# Patient Record
Sex: FEMALE | Race: WHITE | NOT HISPANIC OR LATINO | URBAN - METROPOLITAN AREA
[De-identification: names, ages, dates, MRNs, and addresses within clinical notes are randomized per-mention and may not be internally consistent; named-entity substitution may affect disease eponyms.]

---

## 2019-10-14 PROBLEM — 13644009 HYPERCHOLESTEROLEMIA: Status: ACTIVE | Noted: 2019-10-14

## 2019-10-14 PROBLEM — 13645005 CHRONIC OBSTRUCTIVE PULMONARY DISEASE: Status: ACTIVE | Noted: 2019-10-14

## 2019-10-14 PROBLEM — 70153002 HEMORRHOIDS: Status: ACTIVE | Noted: 2019-10-14

## 2019-10-14 PROBLEM — 35489007 DEPRESSION: Status: ACTIVE | Noted: 2019-10-14

## 2019-10-14 PROBLEM — 38341003 HYPERTENSION: Status: ACTIVE | Noted: 2019-10-14

## 2019-11-18 PROBLEM — 134407002 CHRONIC BACK PAIN: Status: ACTIVE | Noted: 2019-11-18

## 2020-06-10 ENCOUNTER — OFFICE VISIT (OUTPATIENT)
Dept: URBAN - METROPOLITAN AREA CLINIC 46 | Facility: CLINIC | Age: 60
End: 2020-06-10

## 2020-06-22 ENCOUNTER — OFFICE VISIT (OUTPATIENT)
Dept: URBAN - METROPOLITAN AREA CLINIC 46 | Facility: CLINIC | Age: 60
End: 2020-06-22

## 2020-06-23 ENCOUNTER — OFFICE VISIT (OUTPATIENT)
Dept: URBAN - METROPOLITAN AREA CLINIC 13 | Facility: CLINIC | Age: 60
End: 2020-06-23

## 2021-08-28 ENCOUNTER — TELEPHONE ENCOUNTER (OUTPATIENT)
Dept: URBAN - METROPOLITAN AREA CLINIC 13 | Facility: CLINIC | Age: 61
End: 2021-08-28

## 2021-08-28 RX ORDER — IBUPROFEN 800 MG/1
TABLET ORAL
OUTPATIENT
End: 2020-05-20

## 2021-08-28 RX ORDER — METRONIDAZOLE 500 MG/1
TABLET ORAL
OUTPATIENT
Start: 2020-05-22 | End: 2020-05-26

## 2021-08-28 RX ORDER — LISINOPRIL 20 MG/1
TABLET ORAL
OUTPATIENT
End: 2020-05-20

## 2021-08-28 RX ORDER — FLUCONAZOLE 200 MG/1
TABLET ORAL
OUTPATIENT
Start: 2020-05-27 | End: 2020-06-22

## 2021-08-28 RX ORDER — TIZANIDINE 4 MG/1
TABLET ORAL
OUTPATIENT
End: 2020-05-20

## 2021-08-28 RX ORDER — LIDOCAINE 4% CREAM 4.27 G/100G
CREAM TOPICAL
OUTPATIENT
Start: 2019-11-18 | End: 2020-05-20

## 2021-08-28 RX ORDER — EZETIMIBE 10 MG/1
TABLET ORAL
OUTPATIENT
End: 2020-05-20

## 2021-08-28 RX ORDER — CIPROFLOXACIN HYDROCHLORIDE 500 MG/1
TABLET, FILM COATED ORAL
OUTPATIENT
Start: 2020-05-22 | End: 2020-05-26

## 2021-08-28 RX ORDER — PREDNISONE 10 MG/1
TABLET ORAL
OUTPATIENT
Start: 2020-06-16 | End: 2020-06-22

## 2021-08-28 RX ORDER — MELOXICAM 15 MG/1
TABLET ORAL
OUTPATIENT
End: 2020-05-20

## 2021-08-28 RX ORDER — ALBUTEROL SULFATE 90 UG/1
AEROSOL, METERED RESPIRATORY (INHALATION)
OUTPATIENT
End: 2020-05-20

## 2021-08-28 RX ORDER — PREGABALIN 100 MG/1
CAPSULE ORAL
OUTPATIENT
End: 2020-05-22

## 2021-08-29 ENCOUNTER — TELEPHONE ENCOUNTER (OUTPATIENT)
Dept: URBAN - METROPOLITAN AREA CLINIC 13 | Facility: CLINIC | Age: 61
End: 2021-08-29

## 2021-08-29 RX ORDER — PREDNISONE 10 MG/1
TABLET ORAL
Status: ACTIVE | COMMUNITY
Start: 2020-06-09

## 2021-08-29 RX ORDER — LISINOPRIL 20 MG/1
TABLET ORAL
Status: ACTIVE | COMMUNITY

## 2021-08-29 RX ORDER — DICYCLOMINE HYDROCHLORIDE 20 MG/1
TABLET ORAL
Status: ACTIVE | COMMUNITY

## 2021-08-29 RX ORDER — PREGABALIN 100 MG/1
CAPSULE ORAL
Status: ACTIVE | COMMUNITY

## 2021-08-29 RX ORDER — CHOLESTYRAMINE 4 G/5.5G
POWDER, FOR SUSPENSION ORAL
Status: ACTIVE | COMMUNITY
Start: 2020-05-22

## 2021-08-29 RX ORDER — LORATADINE 10 MG/1
CAPSULE, LIQUID FILLED ORAL
Status: ACTIVE | COMMUNITY

## 2021-08-29 RX ORDER — DICLOFENAC SODIUM 75 MG/1
TABLET, DELAYED RELEASE ORAL
Status: ACTIVE | COMMUNITY

## 2021-08-29 RX ORDER — TIZANIDINE 4 MG/1
TABLET ORAL
Status: ACTIVE | COMMUNITY

## 2021-08-29 RX ORDER — DIPHENOXYLATE HYDROCHLORIDE AND ATROPINE SULFATE 2.5; .025 MG/1; MG/1
TABLET ORAL
Status: ACTIVE | COMMUNITY
Start: 2020-05-20

## 2021-09-21 ENCOUNTER — ERX REFILL RESPONSE (OUTPATIENT)
Dept: URBAN - METROPOLITAN AREA CLINIC 44 | Facility: CLINIC | Age: 61
End: 2021-09-21

## 2021-09-21 ENCOUNTER — TELEPHONE ENCOUNTER (OUTPATIENT)
Dept: URBAN - METROPOLITAN AREA CLINIC 46 | Facility: CLINIC | Age: 61
End: 2021-09-21

## 2021-09-21 RX ORDER — MESALAMINE 375 MG/1
TAKE FOUR CAPSULES BY MOUTH DAILY CAPSULE, EXTENDED RELEASE ORAL
Qty: 360 CAPSULE | Refills: 1 | OUTPATIENT

## 2021-10-27 ENCOUNTER — ERX REFILL RESPONSE (OUTPATIENT)
Dept: URBAN - METROPOLITAN AREA CLINIC 44 | Facility: CLINIC | Age: 61
End: 2021-10-27

## 2021-10-27 RX ORDER — DICYCLOMINE HYDROCHLORIDE 20 MG/1
1 TABLET TABLET ORAL
Qty: 120 | Refills: 4 | OUTPATIENT

## 2021-10-27 RX ORDER — DICYCLOMINE HYDROCHLORIDE 20 MG/1
TABLET ORAL
OUTPATIENT

## 2022-06-20 ENCOUNTER — ERX REFILL RESPONSE (OUTPATIENT)
Dept: URBAN - METROPOLITAN AREA CLINIC 44 | Facility: CLINIC | Age: 62
End: 2022-06-20

## 2022-06-20 RX ORDER — DICYCLOMINE HYDROCHLORIDE 20 MG/1
TAKE ONE TABLET BY MOUTH FOUR TIMES A DAY AS NEEDED FOR SPASMS TABLET ORAL
Qty: 120 TABLET | Refills: 3 | OUTPATIENT

## 2022-06-20 RX ORDER — DICYCLOMINE HYDROCHLORIDE 20 MG/1
1 TABLET TABLET ORAL
Qty: 120 | Refills: 4 | OUTPATIENT

## 2022-09-30 ENCOUNTER — TELEPHONE ENCOUNTER (OUTPATIENT)
Dept: URBAN - METROPOLITAN AREA CLINIC 48 | Facility: CLINIC | Age: 62
End: 2022-09-30

## 2022-09-30 ENCOUNTER — ERX REFILL RESPONSE (OUTPATIENT)
Dept: URBAN - METROPOLITAN AREA CLINIC 46 | Facility: CLINIC | Age: 62
End: 2022-09-30

## 2022-09-30 RX ORDER — MESALAMINE 0.38 G/1
TAKE FOUR CAPSULES BY MOUTH DAILY CAPSULE, EXTENDED RELEASE ORAL
Qty: 360 CAPSULE | Refills: 3 | OUTPATIENT

## 2022-09-30 RX ORDER — MESALAMINE 0.38 G/1
TAKE FOUR CAPSULES BY MOUTH DAILY CAPSULE, EXTENDED RELEASE ORAL ONCE A DAY
Qty: 120 | Refills: 0

## 2022-09-30 RX ORDER — MESALAMINE 375 MG/1
TAKE FOUR CAPSULES BY MOUTH DAILY CAPSULE, EXTENDED RELEASE ORAL
Qty: 360 CAPSULE | Refills: 1 | OUTPATIENT

## 2022-10-03 ENCOUNTER — OFFICE VISIT (OUTPATIENT)
Dept: URBAN - METROPOLITAN AREA CLINIC 48 | Facility: CLINIC | Age: 62
End: 2022-10-03
Payer: OTHER GOVERNMENT

## 2022-10-03 VITALS
TEMPERATURE: 97.9 F | DIASTOLIC BLOOD PRESSURE: 66 MMHG | WEIGHT: 234.2 LBS | HEIGHT: 63 IN | SYSTOLIC BLOOD PRESSURE: 119 MMHG | BODY MASS INDEX: 41.5 KG/M2 | HEART RATE: 73 BPM

## 2022-10-03 DIAGNOSIS — R10.30 LOWER ABDOMINAL PAIN: ICD-10-CM

## 2022-10-03 DIAGNOSIS — K59.1 FUNCTIONAL DIARRHEA: ICD-10-CM

## 2022-10-03 PROBLEM — 54586004: Status: ACTIVE | Noted: 2022-10-03

## 2022-10-03 PROCEDURE — 99214 OFFICE O/P EST MOD 30 MIN: CPT | Performed by: INTERNAL MEDICINE

## 2022-10-03 RX ORDER — LISINOPRIL 20 MG/1
TABLET ORAL
COMMUNITY

## 2022-10-03 RX ORDER — MESALAMINE 375 MG/1
4 CAPSULES IN THE MORNING CAPSULE, EXTENDED RELEASE ORAL ONCE A DAY
Status: ACTIVE | COMMUNITY

## 2022-10-03 RX ORDER — COLESTIPOL HYDROCHLORIDE 1 G/1
2 TABLETS. TAKE 2 HOURS AWAY FROM OTHER MEDICATION TABLET, FILM COATED ORAL ONCE A DAY
Qty: 60 | Refills: 6 | OUTPATIENT

## 2022-10-03 RX ORDER — CYCLOBENZAPRINE HYDROCHLORIDE 10 MG/1
1 TABLET AT BEDTIME AS NEEDED TABLET, FILM COATED ORAL ONCE A DAY
COMMUNITY

## 2022-10-03 RX ORDER — PREGABALIN 150 MG/1
1 CAPSULE CAPSULE ORAL TWICE A DAY
COMMUNITY

## 2022-10-03 RX ORDER — HYOSCYAMINE SULFATE 0.12 MG/1
1 TABLET UNDER THE TONGUE AND ALLOW TO DISSOLVE  AS NEEDED TABLET SUBLINGUAL THREE TIMES A DAY
Qty: 90 | Refills: 1 | OUTPATIENT

## 2022-10-03 RX ORDER — DICLOFENAC SODIUM 75 MG/1
TABLET, DELAYED RELEASE ORAL
COMMUNITY

## 2022-10-03 RX ORDER — DIPHENOXYLATE HYDROCHLORIDE AND ATROPINE SULFATE 2.5; .025 MG/1; MG/1
TABLET ORAL
COMMUNITY
Start: 2020-05-20

## 2022-10-03 NOTE — HPI-TODAY'S VISIT:
62 year old female presents for evaluation of diarrhea. Colonoscopy in 2020 for evaluation and path showed crypt changes adn inflammation at which point the pt was started on Mesalamine 0.375 (4 capsules every morning). The patient has also been on Lomotil BID but has flares of more urgent stools when stressed. Bowel movements are daily. At times, she has abdominal cramping.

## 2022-10-07 ENCOUNTER — LAB OUTSIDE AN ENCOUNTER (OUTPATIENT)
Dept: URBAN - METROPOLITAN AREA CLINIC 48 | Facility: CLINIC | Age: 62
End: 2022-10-07

## 2022-10-13 PROBLEM — 47812002: Status: ACTIVE | Noted: 2022-10-03

## 2022-11-08 ENCOUNTER — CLAIMS CREATED FROM THE CLAIM WINDOW (OUTPATIENT)
Dept: URBAN - METROPOLITAN AREA CLINIC 4 | Facility: CLINIC | Age: 62
End: 2022-11-08
Payer: OTHER GOVERNMENT

## 2022-11-08 ENCOUNTER — CLAIMS CREATED FROM THE CLAIM WINDOW (OUTPATIENT)
Dept: URBAN - METROPOLITAN AREA SURGERY CENTER 28 | Facility: SURGERY CENTER | Age: 62
End: 2022-11-08
Payer: OTHER GOVERNMENT

## 2022-11-08 DIAGNOSIS — K63.89 OTHER SPECIFIED DISEASES OF INTESTINE: ICD-10-CM

## 2022-11-08 DIAGNOSIS — R19.7 ACUTE DIARRHEA: ICD-10-CM

## 2022-11-08 DIAGNOSIS — K63.89 BACTERIAL OVERGROWTH SYNDROME: ICD-10-CM

## 2022-11-08 PROCEDURE — 88305 TISSUE EXAM BY PATHOLOGIST: CPT | Performed by: PATHOLOGY

## 2022-11-08 PROCEDURE — G8907 PT DOC NO EVENTS ON DISCHARG: HCPCS | Performed by: INTERNAL MEDICINE

## 2022-11-08 PROCEDURE — 45380 COLONOSCOPY AND BIOPSY: CPT | Performed by: INTERNAL MEDICINE

## 2022-11-08 RX ORDER — CYCLOBENZAPRINE HYDROCHLORIDE 10 MG/1
1 TABLET AT BEDTIME AS NEEDED TABLET, FILM COATED ORAL ONCE A DAY
COMMUNITY

## 2022-11-08 RX ORDER — PREGABALIN 150 MG/1
1 CAPSULE CAPSULE ORAL TWICE A DAY
COMMUNITY

## 2022-11-08 RX ORDER — COLESTIPOL HYDROCHLORIDE 1 G/1
2 TABLETS. TAKE 2 HOURS AWAY FROM OTHER MEDICATION TABLET, FILM COATED ORAL ONCE A DAY
Qty: 60 | Refills: 6 | Status: ACTIVE | COMMUNITY

## 2022-11-08 RX ORDER — MESALAMINE 375 MG/1
4 CAPSULES IN THE MORNING CAPSULE, EXTENDED RELEASE ORAL ONCE A DAY
Status: ACTIVE | COMMUNITY

## 2022-11-08 RX ORDER — DICLOFENAC SODIUM 75 MG/1
TABLET, DELAYED RELEASE ORAL
COMMUNITY

## 2022-11-08 RX ORDER — LISINOPRIL 20 MG/1
TABLET ORAL
COMMUNITY

## 2022-11-08 RX ORDER — DIPHENOXYLATE HYDROCHLORIDE AND ATROPINE SULFATE 2.5; .025 MG/1; MG/1
TABLET ORAL
COMMUNITY
Start: 2020-05-20

## 2022-11-08 RX ORDER — HYOSCYAMINE SULFATE 0.12 MG/1
1 TABLET UNDER THE TONGUE AND ALLOW TO DISSOLVE  AS NEEDED TABLET SUBLINGUAL THREE TIMES A DAY
Qty: 90 | Refills: 1 | Status: ACTIVE | COMMUNITY

## 2022-11-28 ENCOUNTER — OFFICE VISIT (OUTPATIENT)
Dept: URBAN - METROPOLITAN AREA CLINIC 48 | Facility: CLINIC | Age: 62
End: 2022-11-28
Payer: OTHER GOVERNMENT

## 2022-11-28 ENCOUNTER — DASHBOARD ENCOUNTERS (OUTPATIENT)
Age: 62
End: 2022-11-28

## 2022-11-28 VITALS
DIASTOLIC BLOOD PRESSURE: 71 MMHG | BODY MASS INDEX: 41.6 KG/M2 | OXYGEN SATURATION: 94 % | SYSTOLIC BLOOD PRESSURE: 102 MMHG | WEIGHT: 234.8 LBS | HEART RATE: 72 BPM | HEIGHT: 63 IN | TEMPERATURE: 97.9 F

## 2022-11-28 DIAGNOSIS — R10.30 LOWER ABDOMINAL PAIN: ICD-10-CM

## 2022-11-28 DIAGNOSIS — K59.1 FUNCTIONAL DIARRHEA: ICD-10-CM

## 2022-11-28 PROCEDURE — 99214 OFFICE O/P EST MOD 30 MIN: CPT | Performed by: NURSE PRACTITIONER

## 2022-11-28 PROCEDURE — 99214 OFFICE O/P EST MOD 30 MIN: CPT | Performed by: INTERNAL MEDICINE

## 2022-11-28 RX ORDER — LISINOPRIL 20 MG/1
TABLET ORAL
Status: ACTIVE | COMMUNITY

## 2022-11-28 RX ORDER — MESALAMINE 375 MG/1
4 CAPSULES IN THE MORNING CAPSULE, EXTENDED RELEASE ORAL ONCE A DAY
Status: ACTIVE | COMMUNITY

## 2022-11-28 RX ORDER — HYOSCYAMINE SULFATE 0.12 MG/1
1 TABLET UNDER THE TONGUE AND ALLOW TO DISSOLVE  AS NEEDED TABLET SUBLINGUAL THREE TIMES A DAY
Qty: 90 | Refills: 1 | Status: ACTIVE | COMMUNITY

## 2022-11-28 RX ORDER — CYCLOBENZAPRINE HYDROCHLORIDE 10 MG/1
1 TABLET AT BEDTIME AS NEEDED TABLET, FILM COATED ORAL ONCE A DAY
Status: ACTIVE | COMMUNITY

## 2022-11-28 RX ORDER — HYOSCYAMINE SULFATE 0.12 MG/1
1 TABLET UNDER THE TONGUE AND ALLOW TO DISSOLVE  AS NEEDED TABLET SUBLINGUAL THREE TIMES A DAY
Qty: 90 | Refills: 1 | OUTPATIENT

## 2022-11-28 RX ORDER — COLESTIPOL HYDROCHLORIDE 1 G/1
2 TABLETS. TAKE 2 HOURS AWAY FROM OTHER MEDICATION TABLET, FILM COATED ORAL ONCE A DAY
Qty: 60 | Refills: 6 | Status: ACTIVE | COMMUNITY

## 2022-11-28 RX ORDER — PREGABALIN 150 MG/1
1 CAPSULE CAPSULE ORAL TWICE A DAY
Status: ACTIVE | COMMUNITY

## 2022-11-28 NOTE — HPI-TODAY'S VISIT:
62 year old female presents for follow up of diarrhea. Colonoscopy 11/8/22 was normal and random biopises were taken (path results are not available at this time). Colonoscopy in 2020 for evaluation and path showed crypt changes and inflammation at which point the pt was started on Mesalamine 0.375 (4 capsules every morning). The patient has been on Lomotil BID but has flares of more urgent stools when stressed. She was prescribed on Hyoscyamine and Colestipol but she did not start Colestipol. On a recent cruise she ran out of Mesalamine and took 1 Colestipol with good success. Since colonoscopy, her bowel movements have been 1-2 times a day with first stool formed followed by soft stool. Denies blood in stool. If she eats steak, she has abdominal cramping.

## 2022-12-05 ENCOUNTER — OFFICE VISIT (OUTPATIENT)
Dept: URBAN - METROPOLITAN AREA CLINIC 48 | Facility: CLINIC | Age: 62
End: 2022-12-05

## 2023-02-27 ENCOUNTER — OFFICE VISIT (OUTPATIENT)
Dept: URBAN - METROPOLITAN AREA CLINIC 48 | Facility: CLINIC | Age: 63
End: 2023-02-27

## 2024-01-01 NOTE — PHYSICAL EXAM CHEST:
no lesions,  no deformities,  no traumatic injuries,  no significant scars are present,  chest wall non-tender,  no masses present, breathing is unlabored without accessory muscle use,normal breath sounds negative